# Patient Record
Sex: MALE | Race: WHITE
[De-identification: names, ages, dates, MRNs, and addresses within clinical notes are randomized per-mention and may not be internally consistent; named-entity substitution may affect disease eponyms.]

---

## 2017-12-11 ENCOUNTER — HOSPITAL ENCOUNTER (EMERGENCY)
Dept: HOSPITAL 10 - FTE | Age: 22
Discharge: HOME | End: 2017-12-11
Payer: MEDICAID

## 2017-12-11 VITALS
BODY MASS INDEX: 29.79 KG/M2 | BODY MASS INDEX: 29.79 KG/M2 | HEIGHT: 67 IN | WEIGHT: 189.82 LBS | HEIGHT: 67 IN | WEIGHT: 189.82 LBS

## 2017-12-11 VITALS
RESPIRATION RATE: 16 BRPM | HEART RATE: 113 BPM | DIASTOLIC BLOOD PRESSURE: 74 MMHG | SYSTOLIC BLOOD PRESSURE: 114 MMHG | TEMPERATURE: 97.9 F

## 2017-12-11 DIAGNOSIS — J45.901: Primary | ICD-10-CM

## 2017-12-11 PROCEDURE — 94664 DEMO&/EVAL PT USE INHALER: CPT

## 2017-12-11 NOTE — ERD
ER Documentation


Chief Complaint


Chief Complaint


cough and sob x 4 days





HPI


22-year-old male with a history of asthma comes in with cough, wheezing for 4 

days.  Patient has had a dry cough, with wheezing but came to the emergency 

room because of his shortness of breath and he feels as if he is about to run 

out of his inhaler.  He has not had any fevers, chills.





ROS


All systems reviewed and are negative except as per history of present illness.





Medications


Home Meds


Active Scripts


Cetirizine Hcl* (Zyrtec*) 10 Mg Capsule, 10 MG PO DAILY, #10 TAB.CHEW


   Prov:SHEREEN GALEAS PA-C         17


Albuterol Sulfate* (Ventolin HFA*) 18 Gm Hfa.aer.ad, 2 PUFF INHALATION Q4H, #1 

INHALER


   Prov:SHEREEN GALEAS PA-C         17


Prednisone* (Prednisone*) 20 Mg Tab, 40 MG PO DAILY for 4 Days, TAB


   Prov:SHEREEN GALEAS PA-C         17


Prednisone* (Prednisone*) 20 Mg Tab, 40 MG PO DAILY for 4 Days, TAB


   Prov:SERENE DÍAZ PA-C         16


Albuterol Sulfate* (Proair HFA*) 8.5 Gm Hfa.aer.ad, 2 PUFF INH Q4, #1 INHALER


   Prov:SERENE DÍAZ PA-C         16


Azithromycin* (Zithromax*) 250 Mg Tablet, 250 MG PO .ZPACK AS DIRECTED, #6 TAB


   TAKE 500 MG (2 TABS) THE FIRST DAY THEN 250 MG (1 TAB) DAYS 2-5


   Prov:SERENE DÍAZ PA-C         16


Albuterol Sulfate* (Ventolin HFA*) 18 Gm Hfa.aer.ad, 2 PUFF INHALATION Q4H, #1 

INHALER


   Prov:HORACIO RICHARD PA-C         11/15/16





Allergies


Allergies:  


Coded Allergies:  


     No Known Allergy (Unverified , 16)





PMhx/Soc


History of Surgery:  No


Anesthesia Reaction:  No


Hx Neurological Disorder:  No


Hx Respiratory Disorders:  No


Hx Cardiac Disorders:  No


Hx Psychiatric Problems:  No


Hx Miscellaneous Medical Probl:  No (DENIES MED AND SURG HX.)


Hx Alcohol Use:  No


Hx Substance Use:  No


Hx Tobacco Use:  No





Physical Exam


Vitals





Vital Signs








  Date Time  Temp Pulse Resp B/P Pulse Ox O2 Delivery O2 Flow Rate FiO2


 


17 18:38  134 22 122/74 94 Room Air  


 


17 17:46  128 20  95   21


 


17 14:44 98.3 130 20 128/90 94   








Physical Exam


General: Well-developed, well-nourished.  The patient appears in no acute 

distress.


HEENT: Head is normocephalic, atraumatic. No scleral icterus.  


Neck: Supple.  Nontender.


Lungs: Wheezing bilaterally, no rales or rhonchi.  Patient is nonlabored.


Heart: Regular rate and rhythm.  S1 and S2 are normal.  No murmurs, gallops, or 

rubs.


Abdomen: Soft, nontender, nondistended.  Bowel sounds are normoactive.


Extremities: No clubbing or cyanosis.  Normal pulses. Moving extremities x 4. 

No weakness.


Neurologic: Alert and oriented 3.  No focal deficits.


Skin: Normal turgor.  No rash or lesions.


Results 24 hrs





 Current Medications








 Medications


  (Trade)  Dose


 Ordered  Sig/Manuel


 Route


 PRN Reason  Start Time


 Stop Time Status Last Admin


Dose Admin


 


 Albuterol


  (Proventil


 0.083% (Neb))  7.5 mg  ONCE  STAT


 NEB


   17 17:13


 17 17:15 DC 17 17:45


 


 


 Ipratropium


 Bromide


  (Atrovent 0.02%


  (Neb))  0.5 mg  ONCE  STAT


 NEB


   17 17:13


 17 17:15 DC 17 17:45


 


 


 Prednisone 40 mg  40 mg  ONCE  ONCE


 PO


   17 17:30


 17 17:31 DC 17 17:20


 


 


 Sodium Chloride


  (NS)  1,000 ml @ 


 1,000 mls/hr  Q1H ONCE


 IV


   17 19:00


 17 19:59  17 18:54


 











Procedures/MDM


ED COURSE:


Patient was given albuterol, Atrovent and prednisone emergency room.





The patient was given IV fluids, 1 L intravenously to treat tachycardia.  His 

heart rate was reduced down to 110.








MEDICAL DECISION MAKIN-year-old male presents with cough, shortness of breath and asthma symptoms 

the patient had wheezing upon examination, had a breathing treatment with 

Atrovent, albuterol has significant improvement of symptoms.  He did have 

persistent tachycardia with a heart rate of 130, which is the same prior to the 

breathing treatment when he was triaged.  His previous visit shows that his 

heart rate was 112 when he had been seen for cough, he was given a breathing 

treatment and this may have also caused persistent tachycardia despite 

improving his respiratory symptoms.  He was therefore given a fluid bolus of 

normal saline 1 L, his repeat heart rate was 110 and is stable for outpatient 

management and discharged home.





Departure


Diagnosis:  


 Primary Impression:  


 Asthma exacerbation


Condition:  SHEREEN Loredo PA-C Dec 11, 2017 17:28

## 2019-04-03 ENCOUNTER — HOSPITAL ENCOUNTER (EMERGENCY)
Dept: HOSPITAL 91 - FTE | Age: 24
LOS: 1 days | Discharge: HOME | End: 2019-04-04
Payer: SELF-PAY

## 2019-04-03 ENCOUNTER — HOSPITAL ENCOUNTER (EMERGENCY)
Dept: HOSPITAL 10 - FTE | Age: 24
LOS: 1 days | Discharge: HOME | End: 2019-04-04
Payer: SELF-PAY

## 2019-04-03 VITALS
HEIGHT: 66 IN | WEIGHT: 216.05 LBS | WEIGHT: 216.05 LBS | BODY MASS INDEX: 34.72 KG/M2 | HEIGHT: 66 IN | BODY MASS INDEX: 34.72 KG/M2

## 2019-04-03 DIAGNOSIS — L02.11: Primary | ICD-10-CM

## 2019-04-03 PROCEDURE — 99283 EMERGENCY DEPT VISIT LOW MDM: CPT

## 2019-04-03 PROCEDURE — 10060 I&D ABSCESS SIMPLE/SINGLE: CPT

## 2019-04-03 RX ADMIN — LIDOCAINE HYDROCHLORIDE 1 ML: 10 INJECTION, SOLUTION EPIDURAL; INFILTRATION; INTRACAUDAL; PERINEURAL at 23:03

## 2019-04-03 RX ADMIN — HYDROCODONE BITARTRATE AND ACETAMINOPHEN 1 TAB: 5; 325 TABLET ORAL at 22:46

## 2019-04-04 VITALS — SYSTOLIC BLOOD PRESSURE: 131 MMHG | DIASTOLIC BLOOD PRESSURE: 89 MMHG | RESPIRATION RATE: 16 BRPM | HEART RATE: 80 BPM

## 2019-04-04 NOTE — ERD
ER Documentation


Chief Complaint


Chief Complaint





"bump on neck" x2.5 weeks, worse w/ movement. no fever/ST/other symptoms





HPI


23-year-old female presents for posterior left neck bump times 2 weeks.  He 


states that he has been out of 10 pain at times.  He states it is worse with 


movement.  He denies fevers.  No significant past medical history.





ROS


All systems reviewed and are negative except as per history of present illness.





Medications


Home Meds


Active Scripts


Acetaminophen* (Acetaminophen*) 500 MG Extra Strength Tablet, 500 MG PO Q4H PRN 


for PAIN AND OR ELEVATED TEMP, #30 TAB


   Prov:DINO WHITAKER DO         4/4/19


Ibuprofen* (Motrin*) 400 Mg Tab, 400 MG PO Q6H PRN for PAIN AND OR ELEVATED 


TEMP, #30 TAB


   Prov:DINO WHITAKER DO         4/4/19


Cephalexin* (Keflex*) 500 Mg Capsule, 500 MG PO TID for abscess for 5 Days, #15 


CAP


   Prov:DINO WHITAKER DO         4/4/19


Sulfamethoxazole/Trimethoprim* (Bactrim Ds* Tablet) 1 Each Tablet, 1 TAB PO BID 


for abscess for 5 Days, #10 TAB


   Prov:DINO WHITAKER DO         4/4/19


Cetirizine Hcl* (Zyrtec*) 10 Mg Capsule, 10 MG PO DAILY, #10 TAB.CHEW


   Prov:SHEREEN GALEAS PA-C         12/11/17


Albuterol Sulfate* (Ventolin HFA*) 18 Gm Hfa.aer.ad, 2 PUFF INHALATION Q4H, #1 


INHALER


   Prov:SHEREEN GALEAS PA-C         12/11/17


Prednisone* (Prednisone*) 20 Mg Tab, 40 MG PO DAILY for 4 Days, TAB


   Prov:SHEREEN GALEAS PA-C         12/11/17


Prednisone* (Prednisone*) 20 Mg Tab, 40 MG PO DAILY for 4 Days, TAB


   Prov:SERENE DÍAZ PA-C         11/28/16


Albuterol Sulfate* (Proair HFA*) 8.5 Gm Hfa.aer.ad, 2 PUFF INH Q4, #1 INHALER


   Prov:SERENE DÍAZ PA-C         11/28/16


Azithromycin* (Zithromax*) 250 Mg Tablet, 250 MG PO .ZPACK AS DIRECTED, #6 TAB


   TAKE 500 MG (2 TABS) THE FIRST DAY THEN 250 MG (1 TAB) DAYS 2-5


   Prov:SERENE DÍAZ PA-C         11/28/16


Albuterol Sulfate* (Ventolin HFA*) 18 Gm Hfa.aer.ad, 2 PUFF INHALATION Q4H, #1 


INHALER


   Prov:HORACIO RICHARD PA-C         11/15/16





Allergies


Allergies:  


Coded Allergies:  


     No Known Allergy (Unverified , 11/28/16)





PMhx/Soc


History of Surgery:  No


Anesthesia Reaction:  No


Hx Neurological Disorder:  No


Hx Respiratory Disorders:  No


Hx Cardiac Disorders:  No


Hx Psychiatric Problems:  No


Hx Miscellaneous Medical Probl:  No (SEIZURE)


Hx Alcohol Use:  No


Hx Substance Use:  No


Hx Tobacco Use:  No


Smoking Status:  Never smoker





Physical Exam


Vitals





Vital Signs


  Date      Temp  Pulse  Resp  B/P (MAP)   Pulse Ox  O2          O2 Flow    FiO2


Time                                                 Delivery    Rate


    4/3/19  98.1     90    16      144/90        97


     19:39                          (108)





Physical Exam


Const:   No acute distress


Neck:               Full range of motion. No meningismus.  Mass noted to be 


about 2 cm with underlying fluctuance and feels a little bit warm


Resp:   Clear to auscultation bilaterally


Cardio:   Regular rate and rhythm, no murmurs


Skin:   No petechiae or rashes


Ext:    No cyanosis, or edema


Neur:   Awake and alert


Psych:    Normal Mood and Affect


Results 24 hrs





Current Medications


 Medications
   Dose
          Sig/Manuel
       Start Time
   Status  Last


 (Trade)       Ordered        Route
 PRN     Stop Time              Admin
Dose


                              Reason                                Admin


                1 tab          ONCE  ONCE
    4/3/19        DC            4/3/19


Acetaminophen                 PO
            23:00
 4/3/19                22:46



/
                                           23:01


Hydrocodone


Bitart



(Norco


(5/325))


 Lidocaine
     5 ml           ONCE  ONCE
    4/3/19        DC       



(Xylocaine                    INFIL
         23:00
 4/3/19


1%
  (Mpf))                                  23:01








Procedures/MDM


Abscess Incision and Drainage with irrigation by me:


Location:                Left posterior neck


Anesthesia:           [Local 1% Lidocaine without epinephrine]


Technique:             [Irrigated. Disrupted loculations w/ instrumentation]


Packing:                  [Iodoform packing]


Complications:       [Neurovascularly intact post procedure]





48 hour wound check.  Scar minimization instructions given.





Patient's skin symptoms have stabilized while they have been evaluated in the 


department and are appropriate for outpatient care and work up.


Exam and w/u not consistent w/ sepsis, deep space infection, or foreign body.








Medical Decision Making:





Differential diagnosis includes but not limited to abscess, cellulitis, 


dermatitis,





Patient appeared well on physical exam.


Examination consistent with a left neck abscess





ED course:





Incision and drainage was done see procedure note above








Prescription(s):


Patient given prescription for supportive medication(s), patient was given 


Keflex and Bactrim.





Advised to return to the ER in 48 hours for a wound check and removal of 


iodoform packing





Patient advised to follow up with PCP in 1-2 days. Patient advised to return to 


ED for new or worsening symptoms. Patient stable on discharge from the ED.








Disclaimer: Inadvertent spelling and grammatical errors are likely due to EHR/di


ctation software use and do not reflect on the overall quality of patient care. 


Also, please note that the electronic time recorded on this note does not 


necessarily reflect the actual time of the patient encounter.





Departure


Diagnosis:  


   Primary Impression:  


   Abscess


Condition:  Fair


Patient Instructions:  Abscess, Incision And Drainage


Referrals:  


[facility]


YOU HAVE RECEIVED A MEDICAL SCREENING EXAM AND THE RESULTS INDICATE THAT YOU DO 


NOT HAVE A CONDITION THAT REQUIRES URGENT TREATMENT IN THE EMERGENCY DEPARTMENT.





FURTHER EVALUATION AND TREATMENT OF YOUR CONDITION CAN WAIT UNTIL YOU ARE SEEN 


IN YOUR DOCTORS OFFICE WITHIN THE NEXT 1-2 DAYS. IT IS YOUR RESPONSIBILITY TO 


MAKE AN APPOINTMENT FOR FOLOW-UP CARE.





IF YOU HAVE A PRIMARY DOCTOR


--you should call your primary doctor and schedule an appointment





IF YOU DO NOT HAVE A PRIMARY DOCTOR YOU CAN CALL OUR PHYSICIAN REFERRAL HOTLINE 


AT


 [phone] 





IF YOU CAN NOT AFFORD TO SEE A PHYSICIAN YOU CAN CHOSE FROM THE FOLLOWING 


[facility] CLINICS





[facility] [phone] [address] [facility] [phone] [address] [facility]. [facility] [phone] [address] [facility] [phone] [address] [facility] [phone] [address] [facility]. [facility]. [phone] [address]





Additional Instructions:  


Call your primary care doctor TOMORROW for an appointment during the next 1-2 


days.See the doctor sooner or return here if your condition worsens before your 


appointment time.





Return to ED in 48 hours for wound check.











DINO WHITAKER DO                  Apr 4, 2019 00:07

## 2019-04-05 ENCOUNTER — HOSPITAL ENCOUNTER (EMERGENCY)
Dept: HOSPITAL 91 - FTE | Age: 24
Discharge: HOME | End: 2019-04-05
Payer: MEDICAID

## 2019-04-05 ENCOUNTER — HOSPITAL ENCOUNTER (EMERGENCY)
Dept: HOSPITAL 10 - FTE | Age: 24
Discharge: HOME | End: 2019-04-05
Payer: MEDICAID

## 2019-04-05 VITALS — SYSTOLIC BLOOD PRESSURE: 142 MMHG | HEART RATE: 89 BPM | DIASTOLIC BLOOD PRESSURE: 98 MMHG | RESPIRATION RATE: 18 BRPM

## 2019-04-05 VITALS
BODY MASS INDEX: 34.47 KG/M2 | HEIGHT: 66 IN | WEIGHT: 214.51 LBS | HEIGHT: 66 IN | WEIGHT: 214.51 LBS | BODY MASS INDEX: 34.47 KG/M2

## 2019-04-05 DIAGNOSIS — L02.11: Primary | ICD-10-CM

## 2019-04-05 PROCEDURE — 99281 EMR DPT VST MAYX REQ PHY/QHP: CPT

## 2019-04-05 NOTE — ERD
ER Documentation


Chief Complaint


Chief Complaint





RECHECK WOUND ABSCESS ON NECK I&D 2 DAYS AGO





HPI


Is a 23-year-old male patient who presents to the emergency room with request 


for recheck for wound abscess to left neck.  I&D was performed 2 days ago.  


Denies any fevers, states pain has decreased, he has not needed to change the 


outer dressing.  He has been compliant with taking his Keflex and Bactrim.  He 


has not received care from PCP.  Patient was provided with community resources.





ROS


All systems reviewed and are negative except as per history of present illness.





Medications


Home Meds


Active Scripts


Acetaminophen* (Acetaminophen*) 500 MG Extra Strength Tablet, 500 MG PO Q4H PRN 


for PAIN AND OR ELEVATED TEMP, #30 TAB


   Prov:DINO WHITAKER DO         4/4/19


Ibuprofen* (Motrin*) 400 Mg Tab, 400 MG PO Q6H PRN for PAIN AND OR ELEVATED 


TEMP, #30 TAB


   Prov:DINO WHITAKER DO         4/4/19


Cephalexin* (Keflex*) 500 Mg Capsule, 500 MG PO TID for abscess for 5 Days, #15 


CAP


   Prov:DINO WHITAKER DO         4/4/19


Sulfamethoxazole/Trimethoprim* (Bactrim Ds* Tablet) 1 Each Tablet, 1 TAB PO BID 


for abscess for 5 Days, #10 TAB


   Prov:DINO WHITAKER DO         4/4/19


Cetirizine Hcl* (Zyrtec*) 10 Mg Capsule, 10 MG PO DAILY, #10 TAB.CHEW


   Prov:SHEREEN GALEAS PA-C         12/11/17


Albuterol Sulfate* (Ventolin HFA*) 18 Gm Hfa.aer.ad, 2 PUFF INHALATION Q4H, #1 


INHALER


   Prov:SHEREEN GALEAS PA-C         12/11/17


Prednisone* (Prednisone*) 20 Mg Tab, 40 MG PO DAILY for 4 Days, TAB


   Prov:SHEREEN GALEAS PA-C         12/11/17


Prednisone* (Prednisone*) 20 Mg Tab, 40 MG PO DAILY for 4 Days, TAB


   Prov:SERENE DÍAZ PA-C         11/28/16


Albuterol Sulfate* (Proair HFA*) 8.5 Gm Hfa.aer.ad, 2 PUFF INH Q4, #1 INHALER


   Prov:SERENE DÍAZ PA-C         11/28/16


Azithromycin* (Zithromax*) 250 Mg Tablet, 250 MG PO .ZPACK AS DIRECTED, #6 TAB


   TAKE 500 MG (2 TABS) THE FIRST DAY THEN 250 MG (1 TAB) DAYS 2-5


   Prov:SERENE DÍAZ PA-C         11/28/16


Albuterol Sulfate* (Ventolin HFA*) 18 Gm Hfa.aer.ad, 2 PUFF INHALATION Q4H, #1 


INHALER


   Prov:HORACIO RICHARD PA-C         11/15/16





Allergies


Allergies:  


Coded Allergies:  


     No Known Allergy (Unverified , 11/28/16)





PMhx/Soc


Medical and Surgical Hx:  pt denies Medical Hx, pt denies Surgical Hx


History of Surgery:  No


Anesthesia Reaction:  No


Hx Neurological Disorder:  No


Hx Respiratory Disorders:  No


Hx Cardiac Disorders:  No


Hx Psychiatric Problems:  No


Hx Miscellaneous Medical Probl:  No (SEIZURE)


Hx Alcohol Use:  No


Hx Substance Use:  No


Hx Tobacco Use:  No


Smoking Status:  Never smoker





FmHx


Family History:  No diabetes, No coronary disease, No other





Physical Exam


Vitals





Vital Signs


  Date      Temp  Pulse  Resp  B/P (MAP)   Pulse Ox  O2          O2 Flow    FiO2


Time                                                 Delivery    Rate


    4/5/19  97.4     89    18      142/98        98


     08:04                          (113)





Physical Exam


Const:   No acute distress


Head:   Atraumatic 


Eyes:    Normal Conjunctiva


ENT:    Normal External Ears, Nose and Mouth.


Neck:               Full range of motion. No meningismus.


Resp:   Clear to auscultation bilaterally


Cardio:   Regular rate and rhythm, no murmurs


Abd:    Soft, non tender, non distended. Normal bowel sounds


Skin:   No petechiae or rashes. Left neck: area of induration [id number], pink, 


soft, scant purulent drainage followed by sanguineous drainage. Gauze strip 


removed intact. Wound bed, edges, periwound healing appropriately. No tunneling,


no undermining. 


Back:   No midline or flank tenderness


Ext:    No cyanosis, or edema


Neur:   Awake and alert


Psych:    Normal Mood and Affect





Procedures/MDM


This 23-year-old male patient presents with request for wound check to abscess 


to left neck.  Iodoform gauze removed, wound inspected for loculations, no 


tunneling, no undermining, scant purulent drainage followed by sanguineous 


drainage.  Wound edges and wound bed healing within normal limits as expected.  


Periwound area without erythema or cellulitis.  Patient without pain outside of 


wound area.  Neck supple, no neurological deficits.  No worsening signs of 


infection, no increased fever, increased pain, vital signs stable.





Patient was given wound care instructions and encouraged to establish care with 


primary care provider.  Patient was provided with community resources at last 


visit.  Patient requesting  consult for emergency [facility].  Social


worker called to meet with patient.





She given strict instructions to continue antibiotics, and ER precautions.





Departure


Diagnosis:  


   Primary Impression:  


   Abscess


   Additional Impression:  


   Encounter for wound re-check


Condition:  Stable


Patient Instructions:  Wound Care





Additional Instructions:  


Thank you very much for allowing us to participate in your care. 


Your health and safety is our top priority at [facility].





Call your primary care doctor TOMORROW for an appointment during the next 2-4 


days and bring all the information and medications prescribed. 





Have prescriptions filled and follow precisely the directions on the label. 





If the symptoms get worse and your provider is unavailable, return to the 


Emergency Department immediately.








HAVE WOUND RECHECKED IN 3 DAYS. YOU MAY RETURN TO THIS ED, HOWEVER IT IS 


ADVISABLE FOR YOU TO ESTABLISH CARE WITH PRIMARY CARE PROVIDER WITH CLINIC OF 


YOUR CHOICE, COMMUNITY CLINIC LIST HAS BEEN PROVIDED TO YOU. 





PLEASE RETURN TO ED IMMEDIATELY WITH FEVER, WORSENING PAIN, THICK DISCHARGE FROM


WOUND.





KEEP WOUND CLEAN AND DRY. REPLACE DRESSING DAILY OR WHEN YOU NOTICE DRAINAGE ON 


THE BANDAGE. USE WARM COMPRESS 2-3 TIMES PER DAY, 20 MIN. CONTINUE TO TAKE AND 


COMPLETE ALL ANTIBIOTICS THAT WERE PRESCRIBED TO YOU!!! PLEASE TAKE MOTRIN EVERY


6-8 HOURS OR TYLENOL EVERY 4-6 HOURS AS NEEDED FOR PAIN AND DISCOMFORT.











MAR SWANN NP               Apr 5, 2019 08:54

## 2019-04-08 ENCOUNTER — HOSPITAL ENCOUNTER (EMERGENCY)
Dept: HOSPITAL 10 - FTE | Age: 24
Discharge: HOME | End: 2019-04-08
Payer: MEDICAID

## 2019-04-08 ENCOUNTER — HOSPITAL ENCOUNTER (EMERGENCY)
Dept: HOSPITAL 91 - FTE | Age: 24
Discharge: HOME | End: 2019-04-08
Payer: MEDICAID

## 2019-04-08 VITALS
HEIGHT: 66 IN | WEIGHT: 214.07 LBS | WEIGHT: 214.07 LBS | HEIGHT: 66 IN | BODY MASS INDEX: 34.4 KG/M2 | BODY MASS INDEX: 34.4 KG/M2

## 2019-04-08 VITALS — DIASTOLIC BLOOD PRESSURE: 85 MMHG | SYSTOLIC BLOOD PRESSURE: 122 MMHG | HEART RATE: 100 BPM

## 2019-04-08 VITALS — RESPIRATION RATE: 18 BRPM

## 2019-04-08 DIAGNOSIS — Z48.01: Primary | ICD-10-CM

## 2019-04-08 PROCEDURE — 99281 EMR DPT VST MAYX REQ PHY/QHP: CPT

## 2019-04-08 NOTE — ERD
ER Documentation


Chief Complaint


Chief Complaint





wound check for I&D on back of neck





HPI


23-year-old male presents for wound check.  Patient had abscess drained last 


week.  Patient states that his been taking the antibiotics as prescribed and has


been using warm compresses.  Patient denies any fevers, chills, pain, swelling, 


edema, erythema.





ROS


All systems reviewed and are negative except as per history of present illness.





Medications


Home Meds


Active Scripts


Acetaminophen* (Acetaminophen*) 500 MG Extra Strength Tablet, 500 MG PO Q4H PRN 


for PAIN AND OR ELEVATED TEMP, #30 TAB


   Prov:DINO WHITAKER DO         4/4/19


Ibuprofen* (Motrin*) 400 Mg Tab, 400 MG PO Q6H PRN for PAIN AND OR ELEVATED 


TEMP, #30 TAB


   Prov:DINO WHITAKER DO         4/4/19


Cephalexin* (Keflex*) 500 Mg Capsule, 500 MG PO TID for abscess for 5 Days, #15 


CAP


   Prov:DINO WHITAKER DO         4/4/19


Sulfamethoxazole/Trimethoprim* (Bactrim Ds* Tablet) 1 Each Tablet, 1 TAB PO BID 


for abscess for 5 Days, #10 TAB


   Prov:DINO WHITAKER DO         4/4/19


Cetirizine Hcl* (Zyrtec*) 10 Mg Capsule, 10 MG PO DAILY, #10 TAB.CHEW


   Prov:SHEREEN GALEAS PA-C         12/11/17


Albuterol Sulfate* (Ventolin HFA*) 18 Gm Hfa.aer.ad, 2 PUFF INHALATION Q4H, #1 


INHALER


   Prov:SHEREEN GAELAS PA-C         12/11/17


Prednisone* (Prednisone*) 20 Mg Tab, 40 MG PO DAILY for 4 Days, TAB


   Prov:SHEREEN GALEAS PA-C         12/11/17


Prednisone* (Prednisone*) 20 Mg Tab, 40 MG PO DAILY for 4 Days, TAB


   Prov:SERENE DÍAZ PA-C         11/28/16


Albuterol Sulfate* (Proair HFA*) 8.5 Gm Hfa.aer.ad, 2 PUFF INH Q4, #1 INHALER


   Prov:SERENE DÍAZ PA-C         11/28/16


Azithromycin* (Zithromax*) 250 Mg Tablet, 250 MG PO .ZPACK AS DIRECTED, #6 TAB


   TAKE 500 MG (2 TABS) THE FIRST DAY THEN 250 MG (1 TAB) DAYS 2-5


   Prov:SERENE DÍAZ PA-C         11/28/16


Albuterol Sulfate* (Ventolin HFA*) 18 Gm Hfa.aer.ad, 2 PUFF INHALATION Q4H, #1 


INHALER


   Prov:ERAHORACIO BABCOCK AVERY FRANKLIN         11/15/16





Allergies


Allergies:  


Coded Allergies:  


     No Known Allergy (Unverified , 11/28/16)





PMhx/Soc


Medical and Surgical Hx:  pt denies Surgical Hx


History of Surgery:  No


Anesthesia Reaction:  No


Hx Neurological Disorder:  No


Hx Respiratory Disorders:  No


Hx Cardiac Disorders:  No


Hx Psychiatric Problems:  No


Hx Miscellaneous Medical Probl:  No (SEIZURE)


Hx Alcohol Use:  No


Hx Substance Use:  No


Hx Tobacco Use:  No


Smoking Status:  Never smoker





FmHx


Family History:  No diabetes, No coronary disease, No other





Physical Exam


Vitals





Vital Signs


  Date      Temp  Pulse  Resp  B/P (MAP)   Pulse Ox  O2          O2 Flow    FiO2


Time                                                 Delivery    Rate


    4/8/19  98.9    100    18      141/84        97


     15:25                          (103)





Physical Exam


Const:   No acute distress


Head:   Atraumatic 


Eyes:    Normal Conjunctiva


ENT:    Normal External Ears, Nose and Mouth.


Neck:               Full range of motion. No meningismus.


Resp:   Clear to auscultation bilaterally


Cardio:   Regular rate and rhythm, no murmurs


Abd:    Soft, non tender, non distended. Normal bowel sounds


Skin:   Small area of erythema located over the neck consistent with previously 


drained abscess.  There is no fluctuance or induration.  There is no tenderness 


to palpation.  No lymphatic streaking noted.  No discharge noted.  No signs of 


infection


Back:   No midline or flank tenderness


Ext:    No cyanosis, or edema


Neur:   Awake and alert


Psych:    Normal Mood and Affect





Procedures/MDM


Previously drained abscess appears to be healing without complications.  I 


advised patient to continue taking the antibiotics as well as use warm 


compresses.  I have low suspicion for acute space infection, sepsis, or any 


other emergent condition.  Patient discharged with strict ER precautions. 


Patient advised to follow up with PMD. All questions answered at discharge.





Departure


Diagnosis:  


   Primary Impression:  


   Encounter for wound re-check


Condition:  Stable


Patient Instructions:  Wound Care


Referrals:  


COMMUNITY CLINICS


YOU HAVE RECEIVED A MEDICAL SCREENING EXAM AND THE RESULTS INDICATE THAT YOU DO 


NOT HAVE A CONDITION THAT REQUIRES URGENT TREATMENT IN THE EMERGENCY DEPARTMENT.





FURTHER EVALUATION AND TREATMENT OF YOUR CONDITION CAN WAIT UNTIL YOU ARE SEEN 


IN YOUR DOCTORS OFFICE WITHIN THE NEXT 1-2 DAYS. IT IS YOUR RESPONSIBILITY TO 


MAKE AN APPOINTMENT FOR FOLOW-UP CARE.





IF YOU HAVE A PRIMARY DOCTOR


--you should call your primary doctor and schedule an appointment





IF YOU DO NOT HAVE A PRIMARY DOCTOR YOU CAN CALL OUR PHYSICIAN REFERRAL HOTLINE 


AT


 [phone] 





IF YOU CAN NOT AFFORD TO SEE A PHYSICIAN YOU CAN CHOSE FROM THE FOLLOWING C


[facility] [phone] [address] [facility] [phone] [address] [facility] [phone] [address] [facility] [phone] [address] [facility] [phone] [address] [facility]. [facility]. [phone]


[address]





Additional Instructions:  


FOLLOW UP WITH YOUR PRIMARY CARE PHYSICIAN TOMORROW.Return to this facility if 


you are not improving as expected.











MIRELLA EASON                Apr 8, 2019 17:35

## 2019-08-21 ENCOUNTER — HOSPITAL ENCOUNTER (EMERGENCY)
Dept: HOSPITAL 10 - FTE | Age: 24
Discharge: HOME | End: 2019-08-21
Payer: MEDICAID

## 2019-08-21 ENCOUNTER — HOSPITAL ENCOUNTER (EMERGENCY)
Dept: HOSPITAL 91 - FTE | Age: 24
Discharge: HOME | End: 2019-08-21
Payer: SELF-PAY

## 2019-08-21 VITALS — RESPIRATION RATE: 18 BRPM | HEART RATE: 89 BPM | DIASTOLIC BLOOD PRESSURE: 87 MMHG | SYSTOLIC BLOOD PRESSURE: 140 MMHG

## 2019-08-21 VITALS — BODY MASS INDEX: 38.39 KG/M2 | WEIGHT: 195.55 LBS | HEIGHT: 60 IN

## 2019-08-21 DIAGNOSIS — F41.9: Primary | ICD-10-CM

## 2019-08-21 PROCEDURE — 99283 EMERGENCY DEPT VISIT LOW MDM: CPT

## 2019-11-02 ENCOUNTER — HOSPITAL ENCOUNTER (EMERGENCY)
Dept: HOSPITAL 10 - FTE | Age: 24
Discharge: HOME | End: 2019-11-02
Payer: SELF-PAY

## 2019-11-02 VITALS — DIASTOLIC BLOOD PRESSURE: 72 MMHG | HEART RATE: 129 BPM | SYSTOLIC BLOOD PRESSURE: 119 MMHG | RESPIRATION RATE: 18 BRPM

## 2019-11-02 VITALS — HEIGHT: 60 IN | BODY MASS INDEX: 33.85 KG/M2 | WEIGHT: 172.4 LBS

## 2019-11-02 DIAGNOSIS — R05: Primary | ICD-10-CM

## 2019-11-02 PROCEDURE — 96372 THER/PROPH/DIAG INJ SC/IM: CPT

## 2019-11-02 PROCEDURE — 99284 EMERGENCY DEPT VISIT MOD MDM: CPT

## 2019-11-02 PROCEDURE — 71045 X-RAY EXAM CHEST 1 VIEW: CPT
